# Patient Record
Sex: MALE | Race: OTHER | Employment: OTHER | URBAN - METROPOLITAN AREA
[De-identification: names, ages, dates, MRNs, and addresses within clinical notes are randomized per-mention and may not be internally consistent; named-entity substitution may affect disease eponyms.]

---

## 2017-09-29 ENCOUNTER — LAB REQUISITION (OUTPATIENT)
Dept: LAB | Facility: HOSPITAL | Age: 52
End: 2017-09-29
Payer: COMMERCIAL

## 2017-09-29 DIAGNOSIS — Z12.5 ENCOUNTER FOR SCREENING FOR MALIGNANT NEOPLASM OF PROSTATE: ICD-10-CM

## 2017-09-29 DIAGNOSIS — Z00.00 ENCOUNTER FOR GENERAL ADULT MEDICAL EXAMINATION WITHOUT ABNORMAL FINDINGS: ICD-10-CM

## 2017-09-29 PROCEDURE — 84153 ASSAY OF PSA TOTAL: CPT | Performed by: GENERAL PRACTICE

## 2017-09-29 PROCEDURE — 80053 COMPREHEN METABOLIC PANEL: CPT | Performed by: GENERAL PRACTICE

## 2017-09-29 PROCEDURE — 80061 LIPID PANEL: CPT | Performed by: GENERAL PRACTICE

## 2018-07-11 ENCOUNTER — OFFICE VISIT (OUTPATIENT)
Dept: INTERNAL MEDICINE CLINIC | Facility: CLINIC | Age: 53
End: 2018-07-11

## 2018-07-11 VITALS
BODY MASS INDEX: 31.07 KG/M2 | TEMPERATURE: 98 F | SYSTOLIC BLOOD PRESSURE: 150 MMHG | DIASTOLIC BLOOD PRESSURE: 80 MMHG | HEART RATE: 74 BPM | WEIGHT: 182 LBS | HEIGHT: 64 IN

## 2018-07-11 DIAGNOSIS — I10 ESSENTIAL HYPERTENSION: ICD-10-CM

## 2018-07-11 DIAGNOSIS — Z12.11 COLON CANCER SCREENING: ICD-10-CM

## 2018-07-11 DIAGNOSIS — E66.09 CLASS 1 OBESITY DUE TO EXCESS CALORIES WITHOUT SERIOUS COMORBIDITY WITH BODY MASS INDEX (BMI) OF 31.0 TO 31.9 IN ADULT: ICD-10-CM

## 2018-07-11 DIAGNOSIS — Z00.00 PHYSICAL EXAM: Primary | ICD-10-CM

## 2018-07-11 PROBLEM — E66.811 CLASS 1 OBESITY DUE TO EXCESS CALORIES WITHOUT SERIOUS COMORBIDITY WITH BODY MASS INDEX (BMI) OF 31.0 TO 31.9 IN ADULT: Status: ACTIVE | Noted: 2018-07-11

## 2018-07-11 PROCEDURE — 99386 PREV VISIT NEW AGE 40-64: CPT | Performed by: INTERNAL MEDICINE

## 2018-07-11 NOTE — PROGRESS NOTES
Randell Capps is a 52year old male.   Patient presents with:  Physical      HPI:   Pt comes as  A new pt -- here with wife   C/c physical  C/o physical   Came in after work patient also states that he is unable to sleep flat he has to sleep on the side, h lb (82.6 kg)   BMI 31.24 kg/m²   GENERAL: well developed, well nourished,in no apparent distress  SKIN: no rashes,no suspicious lesions  HEENT: atraumatic, normocephalic,ears and throat are clear, short thick neck, crowded oral pharynx   NECK: supple,no ad

## 2018-07-11 NOTE — PATIENT INSTRUCTIONS
Prevention Guidelines, Men Ages 48 to 59  Screening tests and vaccines are an important part of managing your health. Health counseling is essential, too. Below are guidelines for these, for men ages 48 to 59.  Talk with your healthcare provider to make s Prostate cancer Starting at age 39, talk to healthcare provider about risks and benefits of digital rectal exam (MAURICE) and prostate-specific antigen (PSA) screening1 At routine exams   Syphilis Men at increased risk for infection – talk with your healthcare pertussis (Td/Tdap) booster All men in this age group Td every 10 years, or a one-time dose of Tdap instead of a Td booster after age 25, then Td every 8 years   Zoster All men ages 61 and older 1 dose   Counseling Who needs it How often   Diet and exerci

## 2018-10-13 ENCOUNTER — APPOINTMENT (OUTPATIENT)
Dept: LAB | Age: 53
End: 2018-10-13
Attending: INTERNAL MEDICINE
Payer: COMMERCIAL

## 2018-10-13 DIAGNOSIS — Z00.00 PHYSICAL EXAM: ICD-10-CM

## 2018-10-13 PROCEDURE — 85025 COMPLETE CBC W/AUTO DIFF WBC: CPT | Performed by: INTERNAL MEDICINE

## 2018-10-13 PROCEDURE — 36415 COLL VENOUS BLD VENIPUNCTURE: CPT | Performed by: INTERNAL MEDICINE

## 2018-10-13 PROCEDURE — 80053 COMPREHEN METABOLIC PANEL: CPT | Performed by: INTERNAL MEDICINE

## 2018-10-13 PROCEDURE — 84443 ASSAY THYROID STIM HORMONE: CPT | Performed by: INTERNAL MEDICINE

## 2018-10-13 PROCEDURE — 80061 LIPID PANEL: CPT | Performed by: INTERNAL MEDICINE

## 2019-02-13 ENCOUNTER — OFFICE VISIT (OUTPATIENT)
Dept: INTERNAL MEDICINE CLINIC | Facility: CLINIC | Age: 54
End: 2019-02-13
Payer: COMMERCIAL

## 2019-02-13 VITALS
WEIGHT: 185 LBS | SYSTOLIC BLOOD PRESSURE: 148 MMHG | HEIGHT: 64 IN | BODY MASS INDEX: 31.58 KG/M2 | HEART RATE: 69 BPM | DIASTOLIC BLOOD PRESSURE: 87 MMHG

## 2019-02-13 DIAGNOSIS — I10 ESSENTIAL HYPERTENSION: Primary | ICD-10-CM

## 2019-02-13 PROCEDURE — 99213 OFFICE O/P EST LOW 20 MIN: CPT | Performed by: INTERNAL MEDICINE

## 2019-02-13 PROCEDURE — 99212 OFFICE O/P EST SF 10 MIN: CPT | Performed by: INTERNAL MEDICINE

## 2019-02-13 RX ORDER — HYDROCHLOROTHIAZIDE 12.5 MG/1
12.5 TABLET ORAL DAILY
Qty: 90 TABLET | Refills: 3 | Status: SHIPPED | OUTPATIENT
Start: 2019-02-13 | End: 2020-02-08

## 2019-02-13 NOTE — PROGRESS NOTES
Mary Lane is a 52year old male.   Patient presents with:  HTN      HPI:   Pt comes for physcial  C/c physical   C/o states his bp was 150 /90s at home -- old bp machine      Lives with family  Works as a          Current Outpatient Medications visit:    Essential hypertension  -     EKG 12-LEAD; Future  -     hydrochlorothiazide 12.5 MG Oral Tab; Take 1 tablet (12.5 mg total) by mouth daily.     Advised pt to follow a low salt , low sodium (including fast foods and processed foods), can look up D

## 2019-02-13 NOTE — PATIENT INSTRUCTIONS
Eating for your heart doesn’t have to be hard or boring. You just need to know how to make healthier choices. The DASH eating plan has been developed to help you do just that. DASH stands for Dietary Approaches to Stop Hypertension.  It is a plan that has b visible fat. Broil, grill, roast, or boil instead of frying. Remove skin from poultry before eating.  Limit how much red meat you eat.  Nuts, seeds, beans  Servings: 4 to 5 a week  A serving is:  · One-third cup nuts (one and a half ounces)  · 2 tablespoons salt  Stay away from:  · Sausage, quan, and ham  · Flour tortillas  · Packaged muffins, pancakes, and biscuits  · Instant hot cereals  · Cottage cheese  For lunch and dinner  · Fresh fish, chicken, turkey, or meat—baked, broiled, or roasted without salt

## 2021-04-29 ENCOUNTER — TELEPHONE (OUTPATIENT)
Dept: INTERNAL MEDICINE CLINIC | Facility: CLINIC | Age: 56
End: 2021-04-29

## 2021-04-29 DIAGNOSIS — Z12.11 COLON CANCER SCREENING: Primary | ICD-10-CM

## 2021-04-29 NOTE — TELEPHONE ENCOUNTER
Patient's spouse aware to have patient call GI to make appointment and to keep appointment with Katarina Jasso spouse she will need to have patient fill out form for her to be on patient's privacy list.

## 2021-04-29 NOTE — TELEPHONE ENCOUNTER
Patient's spouse called (not on JAKOB) states patient asked for her to call Dr. Margarita Enriquez office to ask for referral for preventative colonoscopy. Patient did not see GI doctor when referral was given in 2018.      Spouse was advised that patient over due for

## 2021-04-29 NOTE — TELEPHONE ENCOUNTER
We will see patient for his physical but signed pended referral--please ask him to make the appointment for the colonoscopy

## 2021-04-29 NOTE — TELEPHONE ENCOUNTER
Spoke to wife who requested to speak to [place] Loss since I would not provide information. Wife is not on HIPPA. Call transferred to Sean Loss.

## 2021-05-05 ENCOUNTER — OFFICE VISIT (OUTPATIENT)
Dept: INTERNAL MEDICINE CLINIC | Facility: CLINIC | Age: 56
End: 2021-05-05
Payer: COMMERCIAL

## 2021-05-05 VITALS
SYSTOLIC BLOOD PRESSURE: 144 MMHG | WEIGHT: 171 LBS | BODY MASS INDEX: 29.19 KG/M2 | DIASTOLIC BLOOD PRESSURE: 67 MMHG | HEIGHT: 64 IN | HEART RATE: 60 BPM

## 2021-05-05 DIAGNOSIS — Z12.11 ENCOUNTER FOR SCREENING COLONOSCOPY: ICD-10-CM

## 2021-05-05 DIAGNOSIS — Z00.00 PHYSICAL EXAM, ANNUAL: Primary | ICD-10-CM

## 2021-05-05 DIAGNOSIS — I10 ESSENTIAL HYPERTENSION: ICD-10-CM

## 2021-05-05 DIAGNOSIS — R73.01 ELEVATED FASTING BLOOD SUGAR: ICD-10-CM

## 2021-05-05 DIAGNOSIS — Z12.5 ENCOUNTER FOR SCREENING FOR MALIGNANT NEOPLASM OF PROSTATE: ICD-10-CM

## 2021-05-05 PROCEDURE — 3008F BODY MASS INDEX DOCD: CPT | Performed by: INTERNAL MEDICINE

## 2021-05-05 PROCEDURE — 99396 PREV VISIT EST AGE 40-64: CPT | Performed by: INTERNAL MEDICINE

## 2021-05-05 PROCEDURE — 3078F DIAST BP <80 MM HG: CPT | Performed by: INTERNAL MEDICINE

## 2021-05-05 PROCEDURE — 3077F SYST BP >= 140 MM HG: CPT | Performed by: INTERNAL MEDICINE

## 2021-05-05 RX ORDER — HYDROCHLOROTHIAZIDE 12.5 MG/1
12.5 TABLET ORAL DAILY
Qty: 90 TABLET | Refills: 0 | Status: SHIPPED | OUTPATIENT
Start: 2021-05-05

## 2021-05-05 NOTE — PROGRESS NOTES
Ashley Zhang is a 60year old male.   Patient presents with:  Physical      HPI:   Pt comes for his physical  C/c physical  C/o mom and dad had covid 6 mns ago and he had a stroke and he was taking care of them but he had no sympt -- both ok now or tenderness  EXTREMITIES: no cyanosis, or edema    ASSESSMENT AND PLAN:   Diagnoses and all orders for this visit:    Physical exam, annual  -     COMP METABOLIC PANEL (14); Future  -     LIPID PANEL;  Future  -     HEMOGLOBIN A1C; Future  -     ASSAY, TH

## 2021-05-27 ENCOUNTER — LAB ENCOUNTER (OUTPATIENT)
Dept: LAB | Facility: HOSPITAL | Age: 56
End: 2021-05-27
Attending: INTERNAL MEDICINE
Payer: COMMERCIAL

## 2021-05-27 DIAGNOSIS — Z12.5 ENCOUNTER FOR SCREENING FOR MALIGNANT NEOPLASM OF PROSTATE: ICD-10-CM

## 2021-05-27 DIAGNOSIS — R73.01 ELEVATED FASTING BLOOD SUGAR: ICD-10-CM

## 2021-05-27 DIAGNOSIS — Z00.00 PHYSICAL EXAM, ANNUAL: ICD-10-CM

## 2021-05-27 PROCEDURE — 80053 COMPREHEN METABOLIC PANEL: CPT

## 2021-05-27 PROCEDURE — 80061 LIPID PANEL: CPT

## 2021-05-27 PROCEDURE — 85027 COMPLETE CBC AUTOMATED: CPT

## 2021-05-27 PROCEDURE — 83036 HEMOGLOBIN GLYCOSYLATED A1C: CPT

## 2021-05-27 PROCEDURE — 36415 COLL VENOUS BLD VENIPUNCTURE: CPT

## 2021-05-27 PROCEDURE — 84443 ASSAY THYROID STIM HORMONE: CPT

## 2021-07-26 ENCOUNTER — LAB ENCOUNTER (OUTPATIENT)
Dept: LAB | Facility: HOSPITAL | Age: 56
End: 2021-07-26
Attending: INTERNAL MEDICINE
Payer: COMMERCIAL

## 2021-07-26 ENCOUNTER — NURSE TRIAGE (OUTPATIENT)
Dept: INTERNAL MEDICINE CLINIC | Facility: CLINIC | Age: 56
End: 2021-07-26

## 2021-07-26 DIAGNOSIS — R19.7 DIARRHEA, UNSPECIFIED TYPE: ICD-10-CM

## 2021-07-26 DIAGNOSIS — R19.7 DIARRHEA, UNSPECIFIED TYPE: Primary | ICD-10-CM

## 2021-07-26 NOTE — TELEPHONE ENCOUNTER
Action Requested: Summary for Provider     []  Critical Lab, Recommendations Needed  [x] Need Additional Advice  []   FYI    []   Need Orders  [] Need Medications Sent to Pharmacy  []  Other     SUMMARY: Dr. Mecca Mueller: please review.    Patient would like to

## 2021-07-26 NOTE — TELEPHONE ENCOUNTER
Spoke with patient (name and  verified), informed of message below. Gave patient phone number for Central Scheduling and transferred call to them.

## 2021-07-26 NOTE — TELEPHONE ENCOUNTER
Unlikely Covid but it has not been 2 weeks since his most recent vaccination so he is not considered \"fully vaccinated\"  Better to get tested due to rising numbers and variance  Covid testing ordered and he should follow the protocol of quarantine until

## 2021-07-27 LAB — SARS-COV-2 RNA RESP QL NAA+PROBE: NOT DETECTED

## 2022-06-21 ENCOUNTER — TELEPHONE (OUTPATIENT)
Dept: INTERNAL MEDICINE CLINIC | Facility: CLINIC | Age: 57
End: 2022-06-21

## 2022-06-21 NOTE — TELEPHONE ENCOUNTER
Patient spouse called and reported:  Yesterday patient started with fever. Scratchy throat, muscle aches, headaches. Temp 101.5; taking tylenol every 4 hours. She had questions if patient is covid positive, can he get anti-viral?  Advised its a possibility however provider would need to review over video visit and confirm if he meets criteria for anti-viral.     Discussed hand hygiene, start taking precautions. Test for covid today or tomorrow. Spouse will go to a near by testing facility (not [facility])    Call back with results.

## 2023-10-30 ENCOUNTER — HOSPITAL ENCOUNTER (OUTPATIENT)
Dept: GENERAL RADIOLOGY | Age: 58
Discharge: HOME | End: 2023-10-30
Attending: INTERNAL MEDICINE
Payer: COMMERCIAL

## 2023-10-30 ENCOUNTER — OFFICE VISIT (OUTPATIENT)
Dept: INTERNAL MEDICINE CLINIC | Facility: CLINIC | Age: 58
End: 2023-10-30

## 2023-10-30 VITALS
HEIGHT: 64 IN | RESPIRATION RATE: 16 BRPM | HEART RATE: 60 BPM | WEIGHT: 174.19 LBS | DIASTOLIC BLOOD PRESSURE: 90 MMHG | BODY MASS INDEX: 29.74 KG/M2 | SYSTOLIC BLOOD PRESSURE: 170 MMHG

## 2023-10-30 DIAGNOSIS — I10 ESSENTIAL HYPERTENSION: ICD-10-CM

## 2023-10-30 DIAGNOSIS — M25.561 ACUTE PAIN OF RIGHT KNEE: Primary | ICD-10-CM

## 2023-10-30 DIAGNOSIS — Z12.5 ENCOUNTER FOR SCREENING FOR MALIGNANT NEOPLASM OF PROSTATE: ICD-10-CM

## 2023-10-30 DIAGNOSIS — E66.09 CLASS 1 OBESITY DUE TO EXCESS CALORIES WITHOUT SERIOUS COMORBIDITY WITH BODY MASS INDEX (BMI) OF 31.0 TO 31.9 IN ADULT: ICD-10-CM

## 2023-10-30 DIAGNOSIS — M25.561 ACUTE PAIN OF RIGHT KNEE: ICD-10-CM

## 2023-10-30 DIAGNOSIS — Z12.11 COLON CANCER SCREENING: ICD-10-CM

## 2023-10-30 PROCEDURE — 3080F DIAST BP >= 90 MM HG: CPT | Performed by: INTERNAL MEDICINE

## 2023-10-30 PROCEDURE — 3077F SYST BP >= 140 MM HG: CPT | Performed by: INTERNAL MEDICINE

## 2023-10-30 PROCEDURE — 99214 OFFICE O/P EST MOD 30 MIN: CPT | Performed by: INTERNAL MEDICINE

## 2023-10-30 PROCEDURE — 73564 X-RAY EXAM KNEE 4 OR MORE: CPT | Performed by: INTERNAL MEDICINE

## 2023-10-30 PROCEDURE — 3008F BODY MASS INDEX DOCD: CPT | Performed by: INTERNAL MEDICINE

## 2023-10-30 RX ORDER — HYDROCHLOROTHIAZIDE 12.5 MG/1
12.5 TABLET ORAL DAILY
Qty: 90 TABLET | Refills: 0 | Status: SHIPPED | OUTPATIENT
Start: 2023-10-30

## 2023-11-02 ENCOUNTER — TELEPHONE (OUTPATIENT)
Dept: INTERNAL MEDICINE CLINIC | Facility: CLINIC | Age: 58
End: 2023-11-02

## 2023-11-02 NOTE — TELEPHONE ENCOUNTER
Pt would like to know when he can return to work and if Dr. Mejia can get him a letter to show his job. Pt was seen on 10/30. Please advise

## 2023-11-03 ENCOUNTER — MED REC SCAN ONLY (OUTPATIENT)
Dept: INTERNAL MEDICINE CLINIC | Facility: CLINIC | Age: 58
End: 2023-11-03

## 2023-11-03 NOTE — TELEPHONE ENCOUNTER
Relayed Dr message, verbalized understanding, stated he want another appt maybe Thursday for a f/u because he may need therapy if not better,advised he was given a PT referral, pt was unaware-relayed number    Please advise on appt -res slot for Thursday ,stated after appt he will know if he can return to work on the 13th

## 2023-11-03 NOTE — TELEPHONE ENCOUNTER
Letter was given at he day of his visit - letter said 2 weeks   Is he feeling better already   Pls assit with telemdicine visit if any concerns

## 2023-11-04 ENCOUNTER — LAB ENCOUNTER (OUTPATIENT)
Dept: LAB | Age: 58
End: 2023-11-04
Attending: INTERNAL MEDICINE
Payer: COMMERCIAL

## 2023-11-04 DIAGNOSIS — E66.09 CLASS 1 OBESITY DUE TO EXCESS CALORIES WITHOUT SERIOUS COMORBIDITY WITH BODY MASS INDEX (BMI) OF 31.0 TO 31.9 IN ADULT: ICD-10-CM

## 2023-11-04 DIAGNOSIS — I10 ESSENTIAL HYPERTENSION: ICD-10-CM

## 2023-11-04 DIAGNOSIS — Z12.5 ENCOUNTER FOR SCREENING FOR MALIGNANT NEOPLASM OF PROSTATE: ICD-10-CM

## 2023-11-04 LAB
ALBUMIN SERPL-MCNC: 4.3 G/DL (ref 3.2–4.8)
ALBUMIN/GLOB SERPL: 1.7 {RATIO} (ref 1–2)
ALP LIVER SERPL-CCNC: 46 U/L
ALT SERPL-CCNC: 16 U/L
ANION GAP SERPL CALC-SCNC: 9 MMOL/L (ref 0–18)
AST SERPL-CCNC: 15 U/L (ref ?–34)
BILIRUB SERPL-MCNC: 0.5 MG/DL (ref 0.2–1.1)
BUN BLD-MCNC: 14 MG/DL (ref 9–23)
BUN/CREAT SERPL: 14.6 (ref 10–20)
CALCIUM BLD-MCNC: 9 MG/DL (ref 8.7–10.4)
CHLORIDE SERPL-SCNC: 103 MMOL/L (ref 98–112)
CHOLEST SERPL-MCNC: 189 MG/DL (ref ?–200)
CO2 SERPL-SCNC: 26 MMOL/L (ref 21–32)
COMPLEXED PSA SERPL-MCNC: 1.57 NG/ML (ref ?–4)
COMPLEXED PSA SERPL-MCNC: 2.64 NG/ML (ref ?–4)
CREAT BLD-MCNC: 0.96 MG/DL
DEPRECATED RDW RBC AUTO: 41.5 FL (ref 35.1–46.3)
EGFRCR SERPLBLD CKD-EPI 2021: 90 ML/MIN/1.73M2 (ref 60–?)
ERYTHROCYTE [DISTWIDTH] IN BLOOD BY AUTOMATED COUNT: 12.9 % (ref 11–15)
FASTING PATIENT LIPID ANSWER: YES
FASTING STATUS PATIENT QL REPORTED: YES
GLOBULIN PLAS-MCNC: 2.6 G/DL (ref 2.8–4.4)
GLUCOSE BLD-MCNC: 95 MG/DL (ref 70–99)
HCT VFR BLD AUTO: 43.6 %
HDLC SERPL-MCNC: 48 MG/DL (ref 40–59)
HGB BLD-MCNC: 14.4 G/DL
LDLC SERPL CALC-MCNC: 128 MG/DL (ref ?–100)
MCH RBC QN AUTO: 29.1 PG (ref 26–34)
MCHC RBC AUTO-ENTMCNC: 33 G/DL (ref 31–37)
MCV RBC AUTO: 88.1 FL
NONHDLC SERPL-MCNC: 141 MG/DL (ref ?–130)
OSMOLALITY SERPL CALC.SUM OF ELEC: 286 MOSM/KG (ref 275–295)
PLATELET # BLD AUTO: 288 10(3)UL (ref 150–450)
POTASSIUM SERPL-SCNC: 4.2 MMOL/L (ref 3.5–5.1)
PROT SERPL-MCNC: 6.9 G/DL (ref 5.7–8.2)
RBC # BLD AUTO: 4.95 X10(6)UL
SODIUM SERPL-SCNC: 138 MMOL/L (ref 136–145)
TRIGL SERPL-MCNC: 71 MG/DL (ref 30–149)
TSI SER-ACNC: 1.43 MIU/ML (ref 0.55–4.78)
VLDLC SERPL CALC-MCNC: 13 MG/DL (ref 0–30)
WBC # BLD AUTO: 5.8 X10(3) UL (ref 4–11)

## 2023-11-04 PROCEDURE — 80053 COMPREHEN METABOLIC PANEL: CPT

## 2023-11-04 PROCEDURE — 36415 COLL VENOUS BLD VENIPUNCTURE: CPT

## 2023-11-04 PROCEDURE — 80061 LIPID PANEL: CPT

## 2023-11-04 PROCEDURE — 84443 ASSAY THYROID STIM HORMONE: CPT

## 2023-11-04 PROCEDURE — 85027 COMPLETE CBC AUTOMATED: CPT

## 2023-11-06 NOTE — TELEPHONE ENCOUNTER
Patient calling to follow up on call, stated is still experiencing pain in knees, and is suppose to be going back to work on Monday. Stated knows should be getting physical therapy, asking if should be going back to work still, or what to tell work.

## 2023-11-07 NOTE — TELEPHONE ENCOUNTER
Patient was left a message to call back.   CSS: ok to schedule [facility] Thursday. (1 slot available)    Detail message was left to call and schedule appt.   I also left ortho [phone] ph# on . He can see anyone available.

## 2023-11-07 NOTE — TELEPHONE ENCOUNTER
His after visit summary was printed and given to him I believe and I would have highlighted the appointments/numbers including the physical therapy if my memory serves me correctly    If he is in so much pain then he should make the appointment with Ortho as I have already placed the referral for Ortho    You can put him  as a telemedicine visit slot for Thursday if that is when he wants the visit

## 2023-11-09 ENCOUNTER — OFFICE VISIT (OUTPATIENT)
Dept: INTERNAL MEDICINE CLINIC | Facility: CLINIC | Age: 58
End: 2023-11-09

## 2023-11-09 VITALS
WEIGHT: 176 LBS | RESPIRATION RATE: 16 BRPM | DIASTOLIC BLOOD PRESSURE: 79 MMHG | SYSTOLIC BLOOD PRESSURE: 155 MMHG | HEIGHT: 64 IN | BODY MASS INDEX: 30.05 KG/M2 | HEART RATE: 62 BPM

## 2023-11-09 DIAGNOSIS — G89.29 CHRONIC PAIN OF RIGHT KNEE: Primary | ICD-10-CM

## 2023-11-09 DIAGNOSIS — M25.561 CHRONIC PAIN OF RIGHT KNEE: Primary | ICD-10-CM

## 2023-11-09 DIAGNOSIS — I10 ESSENTIAL HYPERTENSION: ICD-10-CM

## 2023-11-09 PROCEDURE — 99214 OFFICE O/P EST MOD 30 MIN: CPT | Performed by: INTERNAL MEDICINE

## 2023-11-09 PROCEDURE — 3077F SYST BP >= 140 MM HG: CPT | Performed by: INTERNAL MEDICINE

## 2023-11-09 PROCEDURE — 3008F BODY MASS INDEX DOCD: CPT | Performed by: INTERNAL MEDICINE

## 2023-11-09 PROCEDURE — 3078F DIAST BP <80 MM HG: CPT | Performed by: INTERNAL MEDICINE

## 2023-11-09 RX ORDER — HYDROCHLOROTHIAZIDE 25 MG/1
25 TABLET ORAL DAILY
Qty: 90 TABLET | Refills: 2 | Status: SHIPPED | OUTPATIENT
Start: 2023-11-09

## 2023-11-13 ENCOUNTER — OFFICE VISIT (OUTPATIENT)
Dept: ORTHOPEDICS CLINIC | Facility: CLINIC | Age: 58
End: 2023-11-13

## 2023-11-13 VITALS — BODY MASS INDEX: 30.05 KG/M2 | HEIGHT: 64 IN | WEIGHT: 176 LBS

## 2023-11-13 DIAGNOSIS — M76.891 TENDINITIS OF RIGHT QUADRICEPS TENDON: Primary | ICD-10-CM

## 2023-11-13 RX ORDER — MELOXICAM 15 MG/1
15 TABLET ORAL DAILY
Qty: 30 TABLET | Refills: 0 | Status: SHIPPED | OUTPATIENT
Start: 2023-11-13

## 2023-11-14 ENCOUNTER — TELEPHONE (OUTPATIENT)
Dept: INTERNAL MEDICINE CLINIC | Facility: CLINIC | Age: 58
End: 2023-11-14

## 2023-11-14 NOTE — TELEPHONE ENCOUNTER
Pt called and wanted status on forms that [employer] Purpura for short term disability. Pt stts Steffany perez faxed it to office. Please advise.

## 2023-11-15 ENCOUNTER — TELEPHONE (OUTPATIENT)
Dept: PHYSICAL THERAPY | Facility: HOSPITAL | Age: 58
End: 2023-11-15

## 2023-11-15 NOTE — TELEPHONE ENCOUNTER
Patient calling (identified name and ) to follow up on the forms for short term disability. States it needs to be turned in by 23.

## 2023-11-16 ENCOUNTER — OFFICE VISIT (OUTPATIENT)
Dept: PHYSICAL THERAPY | Age: 58
End: 2023-11-16
Attending: INTERNAL MEDICINE
Payer: COMMERCIAL

## 2023-11-16 DIAGNOSIS — M25.561 ACUTE PAIN OF RIGHT KNEE: Primary | ICD-10-CM

## 2023-11-16 PROCEDURE — 97112 NEUROMUSCULAR REEDUCATION: CPT

## 2023-11-16 PROCEDURE — 97110 THERAPEUTIC EXERCISES: CPT

## 2023-11-16 PROCEDURE — 97140 MANUAL THERAPY 1/> REGIONS: CPT

## 2023-11-17 NOTE — TELEPHONE ENCOUNTER
Called pt spoke to pt informing him we have not received forms pt stated he Spoke with dr Parisa Abarca and was told that she had them and will forward to us. Explained to pt we did not received them via email. Checked through email and did not find will check interoffice for forms. Contacted Dr Parisa Abarca MA to verify if forms were there and she did not have forms.

## 2023-11-20 ENCOUNTER — APPOINTMENT (OUTPATIENT)
Dept: PHYSICAL THERAPY | Age: 58
End: 2023-11-20
Attending: INTERNAL MEDICINE
Payer: COMMERCIAL

## 2023-11-22 ENCOUNTER — OFFICE VISIT (OUTPATIENT)
Dept: PHYSICAL THERAPY | Age: 58
End: 2023-11-22
Attending: INTERNAL MEDICINE
Payer: COMMERCIAL

## 2023-11-22 PROCEDURE — 97110 THERAPEUTIC EXERCISES: CPT

## 2023-11-22 NOTE — PROGRESS NOTES
Diagnosis:   Acute pain of right knee (M25.561)          Referring Provider: Latrice Euceda  Date of Evaluation:    11/16/2023    Precautions:  None Next MD visit:   none scheduled  Date of Surgery: n/a   Insurance Primary/Secondary: Maycol Cruz / N/A     # Auth Visits: n/a            Subjective: Feeling a little better when doing the HEP stretches; after session, pt notes some soreness/fatigue in hips/thighs. Pain: 1/10      Objective: See below treatment log      Assessment: Good tolerance for all exercises, but clearly compensating with technique during hip abd SLR's due to fatigue/weakness. Goals:   - Pt indep with HEP 5 of 7 days a week. - BLE flexibility to [facility] to allow for improved walking with min c/o. - No TTP noted quad and medial knee region.  - Pt with 5/5 MMT RLE to allow for good endurance with standing at machine at work all day with min c/o at worst.    Plan: Continue to progress hip/LE strengthening as able without knee pain. Date: 11/22/2023  TX#: 2/- Date:                 TX#: 3/ Date:                 TX#: 4/ Date:                 TX#: 5/ Date: Tx#: 6/   Ther Ex (39')  - UE/LE bike x 6'  - Standing Hamstring Stretch with Step    - Prone Quadriceps Stretch with Strap    - Supine Single Knee to Chest Stretch   - Straight Leg Raise 30x B  - Prone Hip Extension 30x B  - Sidelying Hip Abduction 30x B  - Shuttle B Leg press 6bands B 30x                     HEP: Access Code [email or web address]    Charges:  Ther Ex x3       Total Timed Treatment: 45 min  Total Treatment Time: 45 min

## 2023-11-27 ENCOUNTER — OFFICE VISIT (OUTPATIENT)
Dept: PHYSICAL THERAPY | Age: 58
End: 2023-11-27
Attending: INTERNAL MEDICINE
Payer: COMMERCIAL

## 2023-11-27 PROCEDURE — 97110 THERAPEUTIC EXERCISES: CPT

## 2023-11-27 NOTE — PROGRESS NOTES
Diagnosis:   Acute pain of right knee (M25.561)          Referring Provider: Jacob Norman  Date of Evaluation:    11/16/2023    Precautions:  None Next MD visit:   none scheduled  Date of Surgery: n/a   Insurance Primary/Secondary: [employer] / N/A     # Auth Visits: n/a            Subjective: Did well over long weekend, but did have one day with increased R knee pain, but better now. Pain: 1/10      Objective: See below treatment log      Assessment: Good tolerance for 1# wt during SLR's, most difficult with abd. Goals:   - Pt indep with HEP 5 of 7 days a week. - BLE flexibility to [facility] to allow for improved walking with min c/o. - No TTP noted quad and medial knee region.  - Pt with 5/5 MMT RLE to allow for good endurance with standing at machine at work all day with min c/o at worst.    Plan: Continue to progress hip/LE strengthening as able without knee pain. Date: 11/22/2023  TX#: 2/- Date: 11/27/2023           TX#: 3/ Date:                 TX#: 4/ Date:                 TX#: 5/ Date: Tx#: 6/   Ther Ex (39')  - UE/LE bike x 6'  - Standing Hamstring Stretch with Step    - Prone Quadriceps Stretch with Strap    - Supine Single Knee to Chest Stretch   - Straight Leg Raise 30x B  - Prone Hip Extension 30x B  - Sidelying Hip Abduction 30x B  - Shuttle B Leg press 6bands B 30x Ther Ex (45')  - UE/LE bike x 6'  - Standing Hamstring Stretch with Step    - Prone Quadriceps Stretch with Strap    - Supine Single Knee to Chest Stretch   - Straight Leg Raise 30x B 1#  - Prone Hip Extension 30x B 1#  - Sidelying Hip Abduction 30x B 1#  - Shuttle: B Leg press 7bands B 30x; R/L leg press 5bands 30x ea                    HEP: Access Code [email or web address] - HEP - pt to wear heavier boots for resistance. Charges:  Ther Ex x3       Total Timed Treatment: 45 min  Total Treatment Time: 45 min

## 2023-11-29 ENCOUNTER — APPOINTMENT (OUTPATIENT)
Dept: PHYSICAL THERAPY | Age: 58
End: 2023-11-29
Attending: INTERNAL MEDICINE
Payer: COMMERCIAL

## 2023-11-29 ENCOUNTER — OFFICE VISIT (OUTPATIENT)
Dept: PHYSICAL THERAPY | Age: 58
End: 2023-11-29
Attending: INTERNAL MEDICINE
Payer: COMMERCIAL

## 2023-11-29 ENCOUNTER — TELEPHONE (OUTPATIENT)
Dept: INTERNAL MEDICINE CLINIC | Facility: CLINIC | Age: 58
End: 2023-11-29

## 2023-11-29 PROCEDURE — 97110 THERAPEUTIC EXERCISES: CPT

## 2023-11-29 NOTE — TELEPHONE ENCOUNTER
Patients spouse is calling and checking the status on this. I informed her per message below that the forms were faxed to the forms department. Nicko then began to question me on things that I had no answer to and tried to provide the forms department phone number for her and she did not take it stating that she was told they would receive a phone call and she is upset because they have not received a call and would like to speak with Rae Lebron.

## 2023-11-29 NOTE — TELEPHONE ENCOUNTER
Faxed over [facility] paperwork for patient, will reach out to forms department to ensure that forms were received

## 2023-11-29 NOTE — TELEPHONE ENCOUNTER
Pt called today I reiterated to him that we never received forms, pt states he will call Malini  to get forms sent via fax or email-- email address and fax number provided to pt.      Gathered the below    Type of Leave:  Continuous FMLA/ DISAB  Reason for Leave:Chronic Right knee pain- currently in PT   Start date of leave: 10/31/2023 - currently off work no rtw date- tbd  How much time needed?: TBD  Forms Due Date: ASAP  Was Fee and Turnaround info Given?: Yes

## 2023-11-29 NOTE — TELEPHONE ENCOUNTER
FMLA/Disab forms received in the forms dept. No valid auth on file. [employer] message sent to pt. Logged for processing. Spoke to phone room rep, she said that she would inform the pt that his forms have been received & our current turn around time.

## 2023-11-29 NOTE — PROGRESS NOTES
Diagnosis:   Acute pain of right knee (M25.561)          Referring Provider: Yu Camp  Date of Evaluation:    11/16/2023    Precautions:  None Next MD visit:   none scheduled  Date of Surgery: n/a   Insurance Primary/Secondary: [employer] / N/A     # Auth Visits: n/a            Subjective: Mild increase in irritation at R knee joint line. Wonders if new single leg press challenge last visit was too much. He did ice the knee yesterday for about 10'. Pain: 1/10      Objective: See below treatment log      Assessment: Good tolerance for 1# wt during SLR's, most difficult with abd. Goals:   - Pt indep with HEP 5 of 7 days a week. - BLE flexibility to [employer] to allow for improved walking with min c/o. - No TTP noted quad and medial knee region.  - Pt with 5/5 MMT RLE to allow for good endurance with standing at machine at work all day with min c/o at worst.    Plan: Continue to progress hip/LE strengthening as able without knee pain. Pt to ice knee at home each of the next few days as well. Date: 11/22/2023  TX#: 2/- Date: 11/27/2023           TX#: 3/ Date:   11/29/2023         TX#: 4/ Date:                 TX#: 5/ Date: Tx#: 6/   Ther Ex (39')  - UE/LE bike x 6'  - Standing Hamstring Stretch with Step    - Prone Quadriceps Stretch with Strap    - Supine Single Knee to Chest Stretch   - Straight Leg Raise 30x B  - Prone Hip Extension 30x B  - Sidelying Hip Abduction 30x B  - Shuttle B Leg press 6bands B 30x Ther Ex (45')  - UE/LE bike x 6'  - Standing Hamstring Stretch with Step    - Prone Quadriceps Stretch with Strap    - Supine Single Knee to Chest Stretch   - Straight Leg Raise 30x B 1#  - Prone Hip Extension 30x B 1#  - Sidelying Hip Abduction 30x B 1#  - Shuttle:  B Leg press 7bands B 30x; R/L leg press 5bands 30x ea Ther Ex (45')  - UE/LE bike x 6'  - Standing Hamstring Stretch with Step    - Prone Quadriceps Stretch with Strap    - Supine Single Knee to Chest Stretch   - Straight Leg Raise 30x B 1#  - Prone Hip Extension 30x B 1#  - Sidelying Hip Abduction 30x B 1#  - Shuttle: B Leg press 7bands B 30x                   HEP: Access Code [email or web address] - HEP - pt to wear heavier boots for resistance. Charges:  Ther Ex x3       Total Timed Treatment: 45 min  Total Treatment Time: 45 min

## 2023-11-30 ENCOUNTER — APPOINTMENT (OUTPATIENT)
Dept: PHYSICAL THERAPY | Facility: HOSPITAL | Age: 58
End: 2023-11-30
Attending: INTERNAL MEDICINE
Payer: COMMERCIAL

## 2023-11-30 NOTE — TELEPHONE ENCOUNTER
Patient called, asking to speak with Lesli Marmolejo, states he is at [phone]. Per communication with Lesli Marmolejo, informed him that Lesli Marmolejo will call him back shortly.

## 2023-11-30 NOTE — TELEPHONE ENCOUNTER
Dr. Nata Parra     *The ACKNOWLEDGE button has been moved to the top right ribbon*    Please sign off on form if you agree to: Fmla/disab due to Chronic R knee pain. Start date 10/30/23-RTW 01/15/24.  (place your signature on the first page only)    -From your Inbasket, Highlight the patient and click Chart   -Double click the [id number] Forms Completion telephone encounter  -Scroll down to the Media section   -Click the blue Hyperlink: Fmla/Disab Dr Nata Parra 82/80/95  -Click Acknowledge located in the top right ribbon/menu   -Drag the mouse into the blank space of the document and a + sign will appear. Left click to   electronically sign the document.      Thank you,      Marquez Colon

## 2023-11-30 NOTE — TELEPHONE ENCOUNTER
Contacted patient and was informed that he is to return to work on 1/15/24.  Time frame will be from 10/30/23-1/14/24 return on 1/15/24

## 2023-11-30 NOTE — TELEPHONE ENCOUNTER
Called patient and left a voicemail to call the office back. FMLA forms need a time period for when patient is requesting time off. When patient calls back please transfer to either [facility] or site.    Thank you in advance

## 2023-11-30 NOTE — TELEPHONE ENCOUNTER
Pt called to confirm we received MyChart consent. Informed pt we did and his forms will be in process of completion.  Pt states his RTW is 1/15/24

## 2023-12-01 ENCOUNTER — NURSE ONLY (OUTPATIENT)
Facility: CLINIC | Age: 58
End: 2023-12-01

## 2023-12-01 DIAGNOSIS — Z12.11 COLON CANCER SCREENING: Primary | ICD-10-CM

## 2023-12-04 RX ORDER — SODIUM, POTASSIUM,MAG SULFATES 17.5-3.13G
SOLUTION, RECONSTITUTED, ORAL ORAL
Qty: 1 EACH | Refills: 0 | Status: SHIPPED | OUTPATIENT
Start: 2023-12-04

## 2023-12-04 NOTE — TELEPHONE ENCOUNTER
Patient called requesting status on forms- was advised forms will be faxed out today and will send mychart once we get confirmation- Forms faxed and awaiting confirmation. . Patient is requesting for forms to be attached to [employer] message

## 2023-12-04 NOTE — PROGRESS NOTES
Dx: average risk crc screening  Colonoscopy with MAC sedation  Split dose suprep  Please review prep instructions with patient    - If the patient is taking oral diabetic medications then they should HOLD diabetic medications the night before and/or the day of the procedure   - If the patient is on insulin, please have the PCP or endocrinologist assist with management of dosing prior to the procedure.  - NO herbal supplements or weight loss medications x 7 days prior to the procedure. - If patient is taking Xarelto OR Eliquis then it needs to be helf for 48 hours prior to the procedure --> Should get approval from the prescribing physician (PCP or cardiologist) to hold this medication prior to the procedure. - If the patient is taking Coumadin then it needs to be on hold Coumadin for 5 days prior to the procedure --> Should get approval from the prescribing physician (PCP or cardiologist) to hold this medication prior to the procedure. - If the patient takes Plavix/Brilinta then it needs to be held for 5 days prior to the procedure --> Should get approval from the prescribing physician (PCP or cardiologist) to hold this medication prior to the procedure. *If the bowel prep prescribed is too expensive/not covered by the patient's insurance please pend an alternative and I will sign that order. Thank you.

## 2023-12-04 NOTE — TELEPHONE ENCOUNTER
Fmla/disab forms faxed to [facility] [phone], right fax confirmation received.  [employer] message sent and forms uploaded per pt request.

## 2023-12-05 ENCOUNTER — APPOINTMENT (OUTPATIENT)
Dept: PHYSICAL THERAPY | Facility: HOSPITAL | Age: 58
End: 2023-12-05
Attending: INTERNAL MEDICINE
Payer: COMMERCIAL

## 2023-12-06 ENCOUNTER — OFFICE VISIT (OUTPATIENT)
Dept: PHYSICAL THERAPY | Age: 58
End: 2023-12-06
Attending: INTERNAL MEDICINE
Payer: COMMERCIAL

## 2023-12-06 PROCEDURE — 97110 THERAPEUTIC EXERCISES: CPT

## 2023-12-06 NOTE — PROGRESS NOTES
Diagnosis:   Acute pain of right knee (M25.561)          Referring Provider: Chantal Ribera  Date of Evaluation:    11/16/2023    Precautions:  None Next MD visit:   none scheduled  Date of Surgery: n/a   Insurance Primary/Secondary: [employer] / N/A     # Auth Visits: n/a            Subjective: Noticeably better, though still some medial R patellar symptoms. Pain: 1/10      Objective: See below treatment log      Assessment: Good tolerance for new exercises in NWB. Goals:   - Pt indep with HEP 5 of 7 days a week. - [facility] to allow for improved walking with min c/o. - No TTP noted quad and medial knee region.  - Pt with 5/5 MMT RLE to allow for good endurance with standing at machine at work all day with min c/o at worst.    Plan: Continue to progress hip/LE strengthening as able without knee pain. Pt to ice knee at home each of the next few days as well. Date: 11/22/2023  TX#: 2/- Date: 11/27/2023           TX#: 3/ Date:   11/29/2023         TX#: 4/ Date:  12/6/2023      TX#: 5/- Date: Tx#: 6/   Ther Ex (39')  - UE/LE bike x 6'  - Standing Hamstring Stretch with Step    - Prone Quadriceps Stretch with Strap    - Supine Single Knee to Chest Stretch   - Straight Leg Raise 30x B  - Prone Hip Extension 30x B  - Sidelying Hip Abduction 30x B  - Shuttle B Leg press 6bands B 30x Ther Ex (45')  - UE/LE bike x 6'  - Standing Hamstring Stretch with Step    - Prone Quadriceps Stretch with Strap    - Supine Single Knee to Chest Stretch   - Straight Leg Raise 30x B 1#  - Prone Hip Extension 30x B 1#  - Sidelying Hip Abduction 30x B 1#  - Shuttle: B Leg press 7bands B 30x; R/L leg press 5bands 30x ea Ther Ex (45')  - UE/LE bike x 6'  - Standing Hamstring Stretch with Step    - Prone Quadriceps Stretch with Strap    - Supine Single Knee to Chest Stretch   - Straight Leg Raise 30x B 1#  - Prone Hip Extension 30x B 1#  - Sidelying Hip Abduction 30x B 1#  - Shuttle:  B Leg press 7bands B 30x Ther Ex (45')  - UE/LE bike x 6'  - Standing Hamstring Stretch with Step    - Prone Quadriceps Stretch with Strap    - Supine Single Knee to Chest Stretch   - Straight Leg Raise 30x B 1#  - LAQ 30x B 1#  - Prone Hip Extension 30x B 1#  - Prone HS curl 30x B 1#  - Sidelying Hip Abduction 30x B 1#  - Shuttle: B Leg press 7bands B 30x                  HEP: Access Code [email or web address] - HEP - pt to wear heavier boots for resistance. Charges:  Ther Ex x3       Total Timed Treatment: 45 min  Total Treatment Time: 45 min

## 2023-12-07 ENCOUNTER — APPOINTMENT (OUTPATIENT)
Dept: PHYSICAL THERAPY | Facility: HOSPITAL | Age: 58
End: 2023-12-07
Attending: INTERNAL MEDICINE
Payer: COMMERCIAL

## 2023-12-11 NOTE — PROGRESS NOTES
Scheduled for:  Colonoscopy [place]  Provider Name:  Dr. Day  Date:  3/11/2024  Location:  [facility]  Sedation:  MAC  Time:  10:45 AM (pt is aware to arrive at 9:45 AM)  Prep:  Suprep  Meds/Allergies Reconciled?: Physician reviewed  Diagnosis with codes:  Colon screening Z12.11  Was patient informed to call insurance with codes (Y/N):  Yes, [facility]. Referral sent?:  Referral was sent at the time of electronic surgical scheduling. [address] [facility] or [facility] [address] [place] [address] notified?:  I sent an electronic request to Endo Scheduling and received a confirmation today. Medication Orders:  N/A   Misc Orders:  N/A   Further instructions given by staff: I discussed the prep instructions with the patient which he verbally understood and is aware that I will send the instructions today.

## 2023-12-12 ENCOUNTER — APPOINTMENT (OUTPATIENT)
Dept: PHYSICAL THERAPY | Facility: HOSPITAL | Age: 58
End: 2023-12-12
Attending: INTERNAL MEDICINE
Payer: COMMERCIAL

## 2023-12-13 ENCOUNTER — OFFICE VISIT (OUTPATIENT)
Dept: PHYSICAL THERAPY | Age: 58
End: 2023-12-13
Attending: INTERNAL MEDICINE
Payer: COMMERCIAL

## 2023-12-13 PROCEDURE — 97110 THERAPEUTIC EXERCISES: CPT

## 2023-12-13 NOTE — PROGRESS NOTES
Diagnosis:   Acute pain of right knee (M25.561)          Referring Provider: Jacob Norman  Date of Evaluation:    11/16/2023    Precautions:  None Next MD visit:   none scheduled  Date of Surgery: n/a   Insurance Primary/Secondary: [facility] / N/A     # Auth Visits: n/a            Subjective: Tolerating more activity. And, added some UE weight exercises at home too. Objective: See below treatment log      Assessment: Progressing well. Goals:   - Pt indep with HEP 5 of 7 days a week. - BLE flexibility to [facility] to allow for improved walking with min c/o. - No TTP noted quad and medial knee region.  - Pt with 5/5 MMT RLE to allow for good endurance with standing at machine at work all day with min c/o at worst.    Plan: Next visit increase to 2# if able. Date: 11/22/2023  TX#: 2/- Date: 11/27/2023           TX#: 3/ Date:   11/29/2023         TX#: 4/ Date:  12/6/2023      TX#: 5/- Date: 12/13/2023  Tx#: 6/-   Ther Ex (39')  - UE/LE bike x 6'  - Standing Hamstring Stretch with Step    - Prone Quadriceps Stretch with Strap    - Supine Single Knee to Chest Stretch   - Straight Leg Raise 30x B  - Prone Hip Extension 30x B  - Sidelying Hip Abduction 30x B  - Shuttle B Leg press 6bands B 30x Ther Ex (45')  - UE/LE bike x 6'  - Standing Hamstring Stretch with Step    - Prone Quadriceps Stretch with Strap    - Supine Single Knee to Chest Stretch   - Straight Leg Raise 30x B 1#  - Prone Hip Extension 30x B 1#  - Sidelying Hip Abduction 30x B 1#  - Shuttle: B Leg press 7bands B 30x; R/L leg press 5bands 30x ea Ther Ex (45')  - UE/LE bike x 6'  - Standing Hamstring Stretch with Step    - Prone Quadriceps Stretch with Strap    - Supine Single Knee to Chest Stretch   - Straight Leg Raise 30x B 1#  - Prone Hip Extension 30x B 1#  - Sidelying Hip Abduction 30x B 1#  - Shuttle:  B Leg press 7bands B 30x Ther Ex (45')  - UE/LE bike x 6'  - Standing Hamstring Stretch with Step    - Prone Quadriceps Stretch with Strap    - Supine Single Knee to Chest Stretch   - Straight Leg Raise 30x B 1#  - LAQ 30x B 1#  - Prone Hip Extension 30x B 1#  - Prone HS curl 30x B 1#  - Sidelying Hip Abduction 30x B 1#  - Shuttle: B Leg press 7bands B 30x Ther Ex (45')  - UE/LE bike x 6'  - Standing Hamstring Stretch with Step    - Prone Quadriceps Stretch with Strap    - Supine Single Knee to Chest Stretch   - Straight Leg Raise 30x B 1#  - LAQ 30x B 1#  - Prone Hip Extension 30x B 1#  - Prone HS curl 30x B 1#  - Sidelying Hip Abduction 30x B 1#  - Shuttle: B Leg press 7bands B 30x  - Shuttle: R, L leg press 4bands ea 30x                 HEP: Access Code [email or web address] - HEP - pt to wear heavier boots for resistance. Charges:  Ther Ex x3       Total Timed Treatment: 45 min  Total Treatment Time: 45 min

## 2023-12-14 ENCOUNTER — APPOINTMENT (OUTPATIENT)
Dept: PHYSICAL THERAPY | Facility: HOSPITAL | Age: 58
End: 2023-12-14
Attending: INTERNAL MEDICINE
Payer: COMMERCIAL

## 2023-12-20 ENCOUNTER — TELEPHONE (OUTPATIENT)
Dept: PHYSICAL THERAPY | Facility: HOSPITAL | Age: 58
End: 2023-12-20

## 2023-12-20 ENCOUNTER — APPOINTMENT (OUTPATIENT)
Dept: PHYSICAL THERAPY | Age: 58
End: 2023-12-20
Attending: INTERNAL MEDICINE
Payer: COMMERCIAL

## 2023-12-20 ENCOUNTER — OFFICE VISIT (OUTPATIENT)
Dept: PHYSICAL THERAPY | Age: 58
End: 2023-12-20
Attending: INTERNAL MEDICINE
Payer: COMMERCIAL

## 2023-12-20 PROCEDURE — 97110 THERAPEUTIC EXERCISES: CPT

## 2023-12-20 NOTE — PROGRESS NOTES
Diagnosis:   Acute pain of right knee (M25.561)          Referring Provider: Bill Márquez  Date of Evaluation:    11/16/2023    Precautions:  None Next MD visit:   none scheduled  Date of Surgery: n/a   Insurance Primary/Secondary: [employer] / N/A     # Auth Visits: n/a            Subjective: Notes more time with no pain, such as walking earlier this week, but then still notes some windows of time with symptoms yet. Objective: See below treatment log      Assessment: Progressing well. Good tolerance for increased resistance today. Goals:   - Pt indep with HEP 5 of 7 days a week. - BLE flexibility to [facility] to allow for improved walking with min c/o. - No TTP noted quad and medial knee region.  - Pt with 5/5 MMT RLE to allow for good endurance with standing at machine at work all day with min c/o at worst.    Plan: Next visit increase to 2# if able. Date: 12/20/2023  TX#: 7/- Date:            TX#:  Date:           TX#:  Date:       TX#:  Date: Tx#:    Ther Ex (40')  - UE/LE bike x 6'  - Standing Hamstring Stretch with Step    - Prone Quadriceps Stretch with Strap    - Supine Single Knee to Chest Stretch   - Straight Leg Raise 30x B 1#  - LAQ 30x B 2#  - Prone Hip Extension 30x B 2#  - Prone HS curl 30x B 2#  - Sidelying Hip Abduction 30x B 2#  - Shuttle: B Leg press 8bands B 30x  - Shuttle: R, L leg press 4bands ea 30x       Neuro Re-Ed (5')  - SLS variations (EO, EC)              HEP: Access Code [email or web address] - HEP - pt to wear heavier boots for resistance. Charges:  Ther Ex x3       Total Timed Treatment: 45 min  Total Treatment Time: 45 min

## 2024-01-09 ENCOUNTER — OFFICE VISIT (OUTPATIENT)
Dept: INTERNAL MEDICINE CLINIC | Facility: CLINIC | Age: 59
End: 2024-01-09
Payer: COMMERCIAL

## 2024-01-09 VITALS
HEIGHT: 64 IN | DIASTOLIC BLOOD PRESSURE: 80 MMHG | HEART RATE: 62 BPM | WEIGHT: 180 LBS | SYSTOLIC BLOOD PRESSURE: 142 MMHG | BODY MASS INDEX: 30.73 KG/M2 | OXYGEN SATURATION: 94 %

## 2024-01-09 DIAGNOSIS — M25.561 ACUTE PAIN OF RIGHT KNEE: Primary | ICD-10-CM

## 2024-01-09 PROCEDURE — 99213 OFFICE O/P EST LOW 20 MIN: CPT | Performed by: NURSE PRACTITIONER

## 2024-01-09 PROCEDURE — 3079F DIAST BP 80-89 MM HG: CPT | Performed by: NURSE PRACTITIONER

## 2024-01-09 PROCEDURE — 3008F BODY MASS INDEX DOCD: CPT | Performed by: NURSE PRACTITIONER

## 2024-01-09 PROCEDURE — 3077F SYST BP >= 140 MM HG: CPT | Performed by: NURSE PRACTITIONER

## 2024-01-09 NOTE — PROGRESS NOTES
HPI:    Patient ID: Peter Lacy is a 58 year old male.      HPI  Right Knee Pain  58-year-old male who is a patient of Dr. Jackie Guzman.  He states he has been going through physical therapy for his right knee and it is improved.  He walks his dog with no pain.  Patient requesting a letter to go back to work without restrictions      Immunization History   Administered Date(s) Administered    Covid-19 Vaccine Pfizer 30 mcg/0.3 ml 06/23/2021, 07/14/2021       History reviewed. No pertinent past medical history.   History reviewed. No pertinent surgical history.   Social History     Socioeconomic History    Marital status:    Tobacco Use    Smoking status: Never    Smokeless tobacco: Never   Vaping Use    Vaping Use: Never used   Substance and Sexual Activity    Alcohol use: Yes     Comment: weekly    Drug use: No    Sexual activity: Yes     Partners: Female          Review of Systems   Constitutional:  Negative for chills, fatigue and fever.   HENT:  Negative for ear pain, hearing loss, sinus pain, sore throat and trouble swallowing.    Eyes:  Negative for pain and visual disturbance.   Respiratory:  Negative for cough, chest tightness and shortness of breath.    Cardiovascular:  Negative for chest pain, palpitations and leg swelling.   Gastrointestinal:  Negative for abdominal pain, constipation, diarrhea, nausea and vomiting.   Endocrine: Negative for cold intolerance and heat intolerance.   Genitourinary:  Negative for dysuria and hematuria.   Musculoskeletal:  Negative for back pain and joint swelling.        Right Knee Pain   Skin:  Negative for rash.   Allergic/Immunologic: Negative for environmental allergies.   Neurological:  Negative for weakness, numbness and headaches.   Hematological:  Does not bruise/bleed easily.   Psychiatric/Behavioral:  Negative for dysphoric mood and sleep disturbance. The patient is not nervous/anxious.               Current Outpatient Medications   Medication  Sig Dispense Refill    Meloxicam 15 MG Oral Tab Take 1 tablet (15 mg total) by mouth daily. 30 tablet 0    hydroCHLOROthiazide 25 MG Oral Tab Take 1 tablet (25 mg total) by mouth daily. 90 tablet 2    Na Sulfate-K Sulfate-Mg Sulf (SUPREP BOWEL PREP KIT) 17.5-3.13-1.6 GM/177ML Oral Solution Take as discussed in clinic (Patient not taking: Reported on 1/9/2024) 1 each 0     Allergies:No Known Allergies   PHYSICAL EXAM:   Physical Exam  Constitutional:       Appearance: Normal appearance. He is well-developed.   HENT:      Head: Normocephalic.      Right Ear: Tympanic membrane normal.      Left Ear: Tympanic membrane normal.      Nose: Nose normal.      Mouth/Throat:      Mouth: Mucous membranes are moist.      Pharynx: No oropharyngeal exudate or posterior oropharyngeal erythema.   Eyes:      General:         Right eye: No discharge.         Left eye: No discharge.      Pupils: Pupils are equal, round, and reactive to light.   Cardiovascular:      Rate and Rhythm: Normal rate and regular rhythm.      Heart sounds: Normal heart sounds. No murmur heard.     No friction rub. No gallop.   Pulmonary:      Effort: Pulmonary effort is normal. No respiratory distress.      Breath sounds: Normal breath sounds. No wheezing, rhonchi or rales.   Abdominal:      General: Bowel sounds are normal. There is no distension.      Palpations: Abdomen is soft. There is no mass.      Tenderness: There is no abdominal tenderness. There is no right CVA tenderness, left CVA tenderness or guarding.   Musculoskeletal:         General: No tenderness.      Cervical back: Normal range of motion and neck supple. No tenderness.      Right lower leg: No edema.      Left lower leg: No edema.   Lymphadenopathy:      Cervical: No cervical adenopathy.   Skin:     General: Skin is warm and dry.      Findings: No rash.   Neurological:      Mental Status: He is alert and oriented to person, place, and time.      Coordination: Coordination normal.      Gait:  Gait normal.   Psychiatric:         Mood and Affect: Mood normal.         Behavior: Behavior normal.         Thought Content: Thought content normal.         Judgment: Judgment normal.       /80 (BP Location: Left arm, Patient Position: Sitting, Cuff Size: adult)   Pulse 62   Ht 5' 4\" (1.626 m)   Wt 180 lb (81.6 kg)   SpO2 94%   BMI 30.90 kg/m²   Wt Readings from Last 2 Encounters:   01/09/24 180 lb (81.6 kg)   11/13/23 176 lb (79.8 kg)     Body mass index is 30.9 kg/m².(2)  Lab Results   Component Value Date    WBC 5.8 11/04/2023    RBC 4.95 11/04/2023    HGB 14.4 11/04/2023    HCT 43.6 11/04/2023    MCV 88.1 11/04/2023    MCH 29.1 11/04/2023    MCHC 33.0 11/04/2023    RDW 12.9 11/04/2023    .0 11/04/2023    MPV 8.7 10/13/2018      Lab Results   Component Value Date    GLU 95 11/04/2023    BUN 14 11/04/2023    BUNCREA 14.6 11/04/2023    CREATSERUM 0.96 11/04/2023    ANIONGAP 9 11/04/2023    GFRNAA 95 05/27/2021    GFRAA 109 05/27/2021    CA 9.0 11/04/2023    OSMOCALC 286 11/04/2023    ALKPHO 46 11/04/2023    AST 15 11/04/2023    ALT 16 11/04/2023    BILT 0.5 11/04/2023    TP 6.9 11/04/2023    ALB 4.3 11/04/2023    GLOBULIN 2.6 (L) 11/04/2023     11/04/2023    K 4.2 11/04/2023     11/04/2023    CO2 26.0 11/04/2023      Lab Results   Component Value Date     05/27/2021    A1C 5.9 (H) 05/27/2021      Lab Results   Component Value Date    CHOLEST 189 11/04/2023    TRIG 71 11/04/2023    HDL 48 11/04/2023     (H) 11/04/2023    VLDL 13 11/04/2023    NONHDLC 141 (H) 11/04/2023      Lab Results   Component Value Date    TSH 1.428 11/04/2023                ASSESSMENT/PLAN:     Problem List Items Addressed This Visit       Acute pain of right knee - Primary     Patient states his knee pain resolved with nonsteroidal anti-inflammatory medication and P.T.    Plan  Patient requesting a letter to go back to work without restrictions.  Recommended Voltaren gel over-the-counter if he  should have a flareup               No orders of the defined types were placed in this encounter.      Meds This Visit:  Requested Prescriptions      No prescriptions requested or ordered in this encounter       Imaging & Referrals:  None         SEBLE Walker

## 2024-01-09 NOTE — ASSESSMENT & PLAN NOTE
Patient states his knee pain resolved with nonsteroidal anti-inflammatory medication and P.T.    Plan  Patient requesting a letter to go back to work without restrictions.  Recommended Voltaren gel over-the-counter if he should have a flareup

## 2024-05-13 ENCOUNTER — ANESTHESIA (OUTPATIENT)
Dept: ENDOSCOPY | Age: 59
End: 2024-05-13
Payer: COMMERCIAL

## 2024-05-13 ENCOUNTER — ANESTHESIA EVENT (OUTPATIENT)
Dept: ENDOSCOPY | Age: 59
End: 2024-05-13
Payer: COMMERCIAL

## 2024-05-13 ENCOUNTER — HOSPITAL ENCOUNTER (OUTPATIENT)
Age: 59
Setting detail: HOSPITAL OUTPATIENT SURGERY
Discharge: HOME | End: 2024-05-13
Attending: STUDENT IN AN ORGANIZED HEALTH CARE EDUCATION/TRAINING PROGRAM | Admitting: STUDENT IN AN ORGANIZED HEALTH CARE EDUCATION/TRAINING PROGRAM

## 2024-05-13 VITALS
RESPIRATION RATE: 18 BRPM | DIASTOLIC BLOOD PRESSURE: 83 MMHG | HEIGHT: 64 IN | SYSTOLIC BLOOD PRESSURE: 166 MMHG | OXYGEN SATURATION: 99 % | WEIGHT: 172 LBS | BODY MASS INDEX: 29.37 KG/M2 | HEART RATE: 49 BPM

## 2024-05-13 DIAGNOSIS — Z12.11 COLON CANCER SCREENING: ICD-10-CM

## 2024-05-13 PROCEDURE — 45385 COLONOSCOPY W/LESION REMOVAL: CPT | Performed by: STUDENT IN AN ORGANIZED HEALTH CARE EDUCATION/TRAINING PROGRAM

## 2024-05-13 PROCEDURE — 99070 SPECIAL SUPPLIES PHYS/QHP: CPT | Performed by: STUDENT IN AN ORGANIZED HEALTH CARE EDUCATION/TRAINING PROGRAM

## 2024-05-13 DEVICE — REPLAY HEMOSTASIS CLIP, 11MM SPAN
Type: IMPLANTABLE DEVICE | Site: COLON | Status: FUNCTIONAL
Brand: REPLAY

## 2024-05-13 RX ORDER — LIDOCAINE HYDROCHLORIDE 10 MG/ML
INJECTION, SOLUTION EPIDURAL; INFILTRATION; INTRACAUDAL; PERINEURAL AS NEEDED
Status: DISCONTINUED | OUTPATIENT
Start: 2024-05-13 | End: 2024-05-13 | Stop reason: SURG

## 2024-05-13 RX ORDER — SODIUM CHLORIDE, SODIUM LACTATE, POTASSIUM CHLORIDE, CALCIUM CHLORIDE 600; 310; 30; 20 MG/100ML; MG/100ML; MG/100ML; MG/100ML
INJECTION, SOLUTION INTRAVENOUS CONTINUOUS
Status: DISCONTINUED | OUTPATIENT
Start: 2024-05-13 | End: 2024-05-13

## 2024-05-13 RX ADMIN — SODIUM CHLORIDE, SODIUM LACTATE, POTASSIUM CHLORIDE, CALCIUM CHLORIDE: 600; 310; 30; 20 INJECTION, SOLUTION INTRAVENOUS at 08:45:00

## 2024-05-13 RX ADMIN — LIDOCAINE HYDROCHLORIDE 50 MG: 10 INJECTION, SOLUTION EPIDURAL; INFILTRATION; INTRACAUDAL; PERINEURAL at 08:45:00

## 2024-05-13 NOTE — ANESTHESIA POSTPROCEDURE EVALUATION
Patient: Peter Lacy    Procedure Summary       Date: 05/13/24 Room / Location: [facility] ENDOSCOPY 02 / [facility] ENDO    Anesthesia Start: 0843 Anesthesia Stop:     Procedure: COLONOSCOPY Diagnosis:       Colon cancer screening      (polyps)    Surgeons: Yariel Stout MD Anesthesiologist:     Anesthesia Type: general, MAC ASA Status: 3            Anesthesia Type: general, MAC    Vitals Value Taken Time   /68 05/13/24 0925   Temp 98.6 05/13/24 0925   Pulse 68 05/13/24 0925   Resp 13 05/13/24 0925   SpO2 98 05/13/24 0925       [facility] AN Post Evaluation:   Patient Evaluated in PACU  Patient Participation: complete - patient participated  Level of Consciousness: awake and alert  Pain Score: 0  Pain Management: adequate  Airway Patency:patent  Dental exam unchanged from preop  Yes    Nausea/Vomiting: none  Cardiovascular Status: acceptable  Respiratory Status: acceptable  Postoperative Hydration acceptable      OMAR SPRAGUE CRNA  5/13/2024 9:25 AM

## 2024-05-13 NOTE — DISCHARGE INSTRUCTIONS
Home Care Instructions for Colonoscopy  with Sedation    Diet:  - Resume your regular diet as tolerated unless otherwise instructed.  - Start with light meals to minimize bloating.  - Do not drink alcohol today.    Medication:  - If you have questions about resuming your normal medications, please contact your Primary Care Physician.    Activities:  - Take it easy today. Do not return to work today.  - Do not drive today.  - Do not operate any machinery today (including kitchen equipment).    Colonoscopy:  - You may notice some rectal \"spotting\" (a little blood on the toilet tissue) for a day or two after the exam. This is normal.  - If you experience any rectal bleeding (not spotting), persistent tenderness or sharp severe abdominal pains, oral temperature over 100 degrees Fahrenheit, light-headedness or dizziness, or any other problems, contact your doctor.        **If unable to reach your doctor, please go to the [facility] Emergency Room**    - Your referring physician will receive a full report of your examination.  - If you do not hear from your doctor's office within two weeks of your biopsy, please call them for your results.    You may be able to see your laboratory results in [email or web address] between 4 and 7 business days.  In some cases, your physician may not have viewed the results before they are released to [email or web address].  If you have questions regarding your results contact the physician who ordered the test/exam by phone or via [email or web address] by choosing \"Ask a Medical Question.\"

## 2024-05-13 NOTE — H&P
History & Physical Examination    Patient Name: Peter Lacy  MRN: [id number]  [facility]: [id number]  YOB: 1965    Diagnosis: screening for colon cancer    Medications Prior to Admission   Medication Sig Dispense Refill Last Dose    Na Sulfate-K Sulfate-Mg Sulf (SUPREP BOWEL PREP KIT) 17.5-3.13-1.6 GM/177ML Oral Solution Take as discussed in clinic (Patient not taking: Reported on 1/9/2024) 1 each 0     Meloxicam 15 MG Oral Tab Take 1 tablet (15 mg total) by mouth daily. (Patient not taking: Reported on 5/7/2024) 30 tablet 0 Not Taking    hydroCHLOROthiazide 25 MG Oral Tab Take 1 tablet (25 mg total) by mouth daily. (Patient not taking: Reported on 5/7/2024) 90 tablet 2 Not Taking     No current facility-administered medications for this encounter.       Allergies:   Allergies   Allergen Reactions    Seasonal ITCHING       Past Medical History:    High blood pressure     History reviewed. No pertinent surgical history.  Family History   Problem Relation Age of Onset    Hypertension Father     Diabetes Father     Heart Disorder Father         heart attack - age 86     Stroke Father         in 2016     Hypertension Mother     Arthritis Mother      Social History     Tobacco Use    Smoking status: Never    Smokeless tobacco: Never   Substance Use Topics    Alcohol use: Yes     Comment: occ.       SYSTEM Check if Review is Normal Check if Physical Exam is Normal If not normal, please explain:   HEENT [X ] [ X]    NECK  [X ] [ X]    HEART [X ] [ X]    LUNGS [X ] [ X]    ABDOMEN [X ] [ X]    EXTREMITIES [X ] [ X]    OTHER        I have discussed the risks and benefits and alternatives of the procedure with the patient/family.  They understand and agree to proceed with plan of care.   I have reviewed the History and Physical done within the last 30 days.  Any changes noted above.    Yariel Stout MD  [facility]

## 2024-05-13 NOTE — ANESTHESIA PREPROCEDURE EVALUATION
Anesthesia PreOp Note    HPI:     Peter Lacy is a 59 year old male who presents for preoperative consultation requested by: Yariel Stout MD    Date of Surgery: 5/13/2024    Procedure(s):  COLONOSCOPY  Indication: Colon cancer screening    Relevant Problems   No relevant active problems       NPO:  Last Liquid Consumption Date: 05/13/24  Last Liquid Consumption Time: 0530  Last Solid Consumption Date: 05/12/24  Last Solid Consumption Time: 1000  Last Liquid Consumption Date: 05/13/24          History Review:  Patient Active Problem List    Diagnosis Date Noted    Acute pain of right knee 01/09/2024    Class 1 obesity due to excess calories without serious comorbidity with body mass index (BMI) of 31.0 to 31.9 in adult 07/11/2018    Essential hypertension 07/11/2018       Past Medical History:    High blood pressure       History reviewed. No pertinent surgical history.    Medications Prior to Admission   Medication Sig Dispense Refill Last Dose    Na Sulfate-K Sulfate-Mg Sulf (SUPREP BOWEL PREP KIT) 17.5-3.13-1.6 GM/177ML Oral Solution Take as discussed in clinic (Patient not taking: Reported on 1/9/2024) 1 each 0     Meloxicam 15 MG Oral Tab Take 1 tablet (15 mg total) by mouth daily. (Patient not taking: Reported on 5/7/2024) 30 tablet 0 Not Taking    hydroCHLOROthiazide 25 MG Oral Tab Take 1 tablet (25 mg total) by mouth daily. (Patient not taking: Reported on 5/7/2024) 90 tablet 2 Not Taking     Current Facility-Administered Medications Ordered in Epic   Medication Dose Route Frequency Provider Last Rate Last Admin    lactated ringers infusion   Intravenous Continuous Yariel Stout MD         No current [facility]-ordered outpatient medications on file.       Allergies   Allergen Reactions    Seasonal ITCHING       Family History   Problem Relation Age of Onset    Hypertension Father     Diabetes Father     Heart Disorder Father         heart attack - age 86     Stroke Father         in 2016      Hypertension Mother     Arthritis Mother      Social History     Socioeconomic History    Marital status:    Tobacco Use    Smoking status: Never    Smokeless tobacco: Never   Vaping Use    Vaping status: Never Used   Substance and Sexual Activity    Alcohol use: Yes     Comment: occ.    Drug use: No    Sexual activity: Yes     Partners: Female       Available pre-op labs reviewed.             Vital Signs:  Body mass index is 29.52 kg/m².   height is 1.626 m (5' 4\") and weight is 78 kg (172 lb). His blood pressure is 143/89 and his pulse is 64. His respiration is 15 and oxygen saturation is 97%.   Vitals:    05/07/24 1248 05/13/24 0818   BP:  143/89   Pulse:  64   Resp:  15   SpO2:  97%   Weight: 78 kg (172 lb)    Height: 1.626 m (5' 4\")         Anesthesia Evaluation     Patient summary reviewed and Nursing notes reviewed    Airway   Mallampati: II  Dental - Dentition appears grossly intact     Pulmonary - negative ROS and normal exam   Cardiovascular - normal exam  (+) hypertension    Neuro/Psych - negative ROS     GI/Hepatic/Renal    (+) bowel prep    Endo/Other - negative ROS   Abdominal                  Anesthesia Plan:   ASA:  3  Plan:   General and MAC  Informed Consent Plan and Risks Discussed With:  Patient  Discussed plan with:  CRNA and surgeon      I have informed Peter Regino and/or legal guardian or family member of the nature of the anesthetic plan, benefits, risks including possible dental damage if relevant, major complications, and any alternative forms of anesthetic management.   All of the patient's questions were answered to the best of my ability. The patient desires the anesthetic management as planned.  OMAR SPRAGUE CRNA  5/13/2024 8:36 AM  Present on Admission:  **None**

## 2024-05-13 NOTE — OPERATIVE REPORT
COLONOSCOPY REPORT    Peter Lacy     1965 Age 59 year old   PCP Jackie Guzman MD Endoscopist Yariel Stout MD       Date of procedure: 24    Procedure: Colonoscopy w/ cold snare polypectomy, cold forceps polypectomy    Pre-operative diagnosis: screening    Post-operative diagnosis: polyps, diverticulosis    Medications: MAC    Withdrawal time: 30 minutes    Procedure:  Informed consent was obtained from the patient after the risks of the procedure were discussed, including but not limited to bleeding, perforation, aspiration, infection, or possibility of a missed lesion. After discussions of the risks/benefits and alternatives to this procedure, as well as the planned sedation, the patient was placed in the left lateral decubitus position and begun on continuous blood pressure pulse oximetry and EKG monitoring and this was maintained throughout the procedure. Once an adequate level of sedation was obtained a digital rectal exam was completed. Then the lubricated tip of the pediatric [id number] diagnostic video colonoscope was inserted and advanced without difficulty to the cecum using water immersion and CO2 insufflation technique. The cecum was identified by localizing the trifold, the appendix and the ileocecal valve. Withdrawal was begun with thorough washing and careful examination of the colonic walls and folds. A routine second examination of the cecum/ascending colon was performed. Photodocumentation was obtained. The bowel prep was good. Views of the colon were good with washing. I then carefully withdrew the instrument from the patient who tolerated the procedure well.     Complications: none.    Findings:   1. 3 polyps noted as follows:      A. 8 mm polyp in the cecum; sessile morphology; cold snare polypectomy performed, polyp retrieved. Oozing after resection for which 1 hemostatic clip was placed with cessation in bleeding.      B. 2 mm polyp in the transverse colon;  sessile morphology; cold forceps polypectomy performed, polyp retrieved.      C. 12 mm polyp in the transverse colon; semipedunculated morphology; cold snare polypectomy performed, polyp retrieved. Oozing after resection which stopped after placement of 4 hemostatic clips.    2. Diverticulosis: small diverticula visualized in the left colon.    3. Ileocecal valve appeared healthy and normal. Terminal ileum was intubated and appeared normal.    4. The colonic mucosa throughout the colon showed normal vascular pattern, without evidence of angioectasias or inflammation.     5. A retroflexed view of the rectum was unremarkable.    6. MAURICE: normal rectal tone, no masses palpated.     Impression:   3 polyps resected ranging in size from 2 mm to 12 mm. Removed via cold snare/cold biopsy forceps. In total 5 hemostatic clips were placed, 4 of which were placed on the defect of the transverse colon site.  Diverticulosis.  The colon was otherwise normal with glistening mucosa and intact vascular pattern.    Recommend:  Await pathology. The interval for the next colonoscopy will be determined after reviewing pathology. If new signs or symptoms develop, colonoscopy may need to be repeated sooner.   High fiber diet.  Monitor for blood in the stool. If having more than just tinge of blood, call office or go to the ER.  Repeat in 3 years.  No NSAIDS for 14 days.    >>>If tissue was obtained and you have not received your pathology results either by phone or letter within 2 weeks, please call our office at [phone].    Specimens: polyps    Blood loss: <1 ml

## 2024-05-16 NOTE — PROGRESS NOTES
3 adenomas removed, needs repeat in 3 years.    Mychart sent.    GI Staff:    Can you please place recall for this patient to have a colonoscopy in 3 years.    Thank you.    Yariel Stout MD

## 2024-05-17 ENCOUNTER — TELEPHONE (OUTPATIENT)
Facility: CLINIC | Age: 59
End: 2024-05-17

## 2024-05-17 NOTE — TELEPHONE ENCOUNTER
----- Message from Yariel Stout sent at 5/16/2024  1:47 PM CDT -----  3 adenomas removed, needs repeat in 3 years.    Mychart sent.    GI Staff:    Can you please place recall for this patient to have a colonoscopy in 3 years.    Thank you.    Yariel Stout MD

## 2024-05-17 NOTE — TELEPHONE ENCOUNTER
Recall colon in 3 years per Dr Stout. Colon done 05/13/2024    Health maintenance updated and message sent to pt outreach to repeat colonoscopy in 3 years

## 2025-08-09 ENCOUNTER — OFFICE VISIT (OUTPATIENT)
Dept: INTERNAL MEDICINE CLINIC | Facility: CLINIC | Age: 60
End: 2025-08-09

## 2025-08-09 ENCOUNTER — LAB ENCOUNTER (OUTPATIENT)
Dept: LAB | Facility: HOSPITAL | Age: 60
End: 2025-08-09
Attending: INTERNAL MEDICINE

## 2025-08-09 VITALS
BODY MASS INDEX: 30.56 KG/M2 | HEART RATE: 70 BPM | WEIGHT: 179 LBS | TEMPERATURE: 97 F | SYSTOLIC BLOOD PRESSURE: 148 MMHG | HEIGHT: 64 IN | DIASTOLIC BLOOD PRESSURE: 86 MMHG

## 2025-08-09 DIAGNOSIS — Z12.5 ENCOUNTER FOR SCREENING FOR MALIGNANT NEOPLASM OF PROSTATE: ICD-10-CM

## 2025-08-09 DIAGNOSIS — R73.03 PREDIABETES: ICD-10-CM

## 2025-08-09 DIAGNOSIS — Z00.00 PHYSICAL EXAM, ANNUAL: Primary | ICD-10-CM

## 2025-08-09 DIAGNOSIS — Z00.00 PHYSICAL EXAM, ANNUAL: ICD-10-CM

## 2025-08-09 LAB
ALBUMIN SERPL-MCNC: 4.7 G/DL (ref 3.2–4.8)
ALBUMIN/GLOB SERPL: 1.8 (ref 1–2)
ALP LIVER SERPL-CCNC: 51 U/L (ref 45–117)
ALT SERPL-CCNC: 17 U/L (ref 10–49)
ANION GAP SERPL CALC-SCNC: 8 MMOL/L (ref 0–18)
AST SERPL-CCNC: 17 U/L (ref ?–34)
BILIRUB SERPL-MCNC: 0.6 MG/DL (ref 0.2–1.1)
BUN BLD-MCNC: 12 MG/DL (ref 9–23)
BUN/CREAT SERPL: 11.1 (ref 10–20)
CALCIUM BLD-MCNC: 9 MG/DL (ref 8.7–10.4)
CHLORIDE SERPL-SCNC: 102 MMOL/L (ref 98–112)
CHOLEST SERPL-MCNC: 182 MG/DL (ref ?–200)
CO2 SERPL-SCNC: 28 MMOL/L (ref 21–32)
COMPLEXED PSA SERPL-MCNC: 2.68 NG/ML (ref ?–4)
CREAT BLD-MCNC: 1.08 MG/DL (ref 0.7–1.3)
DEPRECATED RDW RBC AUTO: 39.8 FL (ref 35.1–46.3)
EGFRCR SERPLBLD CKD-EPI 2021: 78 ML/MIN/1.73M2 (ref 60–?)
ERYTHROCYTE [DISTWIDTH] IN BLOOD BY AUTOMATED COUNT: 12.7 % (ref 11–15)
EST. AVERAGE GLUCOSE BLD GHB EST-MCNC: 134 MG/DL (ref 68–126)
FASTING PATIENT LIPID ANSWER: YES
FASTING STATUS PATIENT QL REPORTED: YES
GLOBULIN PLAS-MCNC: 2.6 G/DL (ref 2–3.5)
GLUCOSE BLD-MCNC: 95 MG/DL (ref 70–99)
HBA1C MFR BLD: 6.3 % (ref ?–5.7)
HCT VFR BLD AUTO: 42.2 % (ref 39–53)
HDLC SERPL-MCNC: 48 MG/DL (ref 40–59)
HGB BLD-MCNC: 14.5 G/DL (ref 13–17.5)
LDLC SERPL CALC-MCNC: 115 MG/DL (ref ?–100)
MCH RBC QN AUTO: 29.4 PG (ref 26–34)
MCHC RBC AUTO-ENTMCNC: 34.4 G/DL (ref 31–37)
MCV RBC AUTO: 85.4 FL (ref 80–100)
NONHDLC SERPL-MCNC: 134 MG/DL (ref ?–130)
OSMOLALITY SERPL CALC.SUM OF ELEC: 286 MOSM/KG (ref 275–295)
PLATELET # BLD AUTO: 259 10(3)UL (ref 150–450)
POTASSIUM SERPL-SCNC: 3.9 MMOL/L (ref 3.5–5.1)
PROT SERPL-MCNC: 7.3 G/DL (ref 5.7–8.2)
RBC # BLD AUTO: 4.94 X10(6)UL (ref 4.3–5.7)
SODIUM SERPL-SCNC: 138 MMOL/L (ref 136–145)
TRIGL SERPL-MCNC: 105 MG/DL (ref 30–149)
TSI SER-ACNC: 1.33 UIU/ML (ref 0.55–4.78)
VLDLC SERPL CALC-MCNC: 18 MG/DL (ref 0–30)
WBC # BLD AUTO: 7.8 X10(3) UL (ref 4–11)

## 2025-08-09 PROCEDURE — 99396 PREV VISIT EST AGE 40-64: CPT | Performed by: INTERNAL MEDICINE

## 2025-08-09 PROCEDURE — 83036 HEMOGLOBIN GLYCOSYLATED A1C: CPT

## 2025-08-09 PROCEDURE — 80061 LIPID PANEL: CPT

## 2025-08-09 PROCEDURE — 90715 TDAP VACCINE 7 YRS/> IM: CPT | Performed by: INTERNAL MEDICINE

## 2025-08-09 PROCEDURE — 90471 IMMUNIZATION ADMIN: CPT | Performed by: INTERNAL MEDICINE

## 2025-08-09 PROCEDURE — 80053 COMPREHEN METABOLIC PANEL: CPT

## 2025-08-09 PROCEDURE — 36415 COLL VENOUS BLD VENIPUNCTURE: CPT

## 2025-08-09 PROCEDURE — 85027 COMPLETE CBC AUTOMATED: CPT

## 2025-08-09 PROCEDURE — 84443 ASSAY THYROID STIM HORMONE: CPT

## (undated) DEVICE — KIT CLEAN ENDOKIT 1.1OZ GOWNX2

## (undated) DEVICE — MEDI-VAC NON-CONDUCTIVE SUCTION TUBING 6MM X 1.8M (6FT.) L: Brand: CARDINAL HEALTH

## (undated) DEVICE — GIJAW SINGLE-USE BIOPSY FORCEPS WITH NEEDLE: Brand: GIJAW

## (undated) DEVICE — Device: Brand: DUAL NARE NASAL CANNULAE FEMALE LUER CON 7FT O2 TUBE

## (undated) DEVICE — KIT ENDO ORCAPOD 160/180/190

## (undated) DEVICE — SNARE ENDOSCP 9MM SHTH DIA2.4MM L230CM EXACTO

## (undated) DEVICE — TRAP POLYP W/ 2 SPEC TY CLR MAGNIFYING WIND

## (undated) DEVICE — 60 ML SYRINGE REGULAR TIP: Brand: MONOJECT

## (undated) DEVICE — SINGLE-USE SNARE: Brand: CAPTIVATOR™ COLD

## (undated) NOTE — LETTER
1/9/2024          To Whom It May Concern:    Peter Lacy is currently under my medical care and may not return to work at this time.    He may return to work on 01/15/2024  Activity is restricted as follows: none.    If you require additional information please contact our office.        Sincerely,     SEBLE Walker

## (undated) NOTE — LETTER
08/10/18        Peter Curtis [address]      Dear Urbano Woodruff,    [facility] records indicate that you have outstanding lab work and or testing that was ordered for you and has not yet been completed:          Comp Metabolic Panel (14) [E

## (undated) NOTE — LETTER
[facility]  [address], [place], [place]    Authorization for Surgical Operation and Procedure                               I hereby authorize Yariel Stout MD, my physician and his/her assistants (if applicable), which may include medical students, residents, and/or fellows, to perform the following surgical operation/ procedure and administer such anesthesia as may be determined necessary by my physician: Operation/Procedure name (s) COLONOSCOPY on Peter Regino   2.   I recognize that during the surgical operation/procedure, unforeseen conditions may necessitate additional or different procedures than those listed above.  I, therefore, further authorize and request that the above-named surgeon, assistants, or designees perform such procedures as are, in their judgment, necessary and desirable.    3.   My surgeon/physician has discussed prior to my surgery the potential benefits, risks and side effects of this procedure; the likelihood of achieving goals; and potential problems that might occur during recuperation.  They also discussed reasonable alternatives to the procedure, including risks, benefits, and side effects related to the alternatives and risks related to not receiving this procedure.  I have had all my questions answered and I acknowledge that no guarantee has been made as to the result that may be obtained.    4.   Should the need arise during my operation/procedure, which includes change of level of care prior to discharge, I also consent to the administration of blood and/or blood products.  Further, I understand that despite careful testing and screening of blood or blood products by collecting agencies, I may still be subject to ill effects as a result of receiving a blood transfusion and/or blood products.  The following are some, but not all, of the potential risks that can occur: fever and allergic reactions, hemolytic reactions, transmission of diseases such  as Hepatitis, AIDS and Cytomegalovirus (CMV) and fluid overload.  In the event that I wish to have an autologous transfusion of my own blood, or a directed donor transfusion, I will discuss this with my physician.  Check only if Refusing Blood or Blood Products  I understand refusal of blood or blood products as deemed necessary by my physician may have serious consequences to my condition to include possible death. I hereby assume responsibility for my refusal and release the hospital, its personnel, and my physicians from any responsibility for the consequences of my refusal.    o  Refuse   5.   I authorize the use of any specimen, organs, tissues, body parts or foreign objects that may be removed from my body during the operation/procedure for diagnosis, research or teaching purposes and their subsequent disposal by hospital authorities.  I also authorize the release of specimen test results and/or written reports to my treating physician on the hospital medical staff or other referring or consulting physicians involved in my care, at the discretion of the Pathologist or my treating physician.    6.   I consent to the photographing or videotaping of the operations or procedures to be performed, including appropriate portions of my body for medical, scientific, or educational purposes, provided my identity is not revealed by the pictures or by descriptive texts accompanying them.  If the procedure has been photographed/videotaped, the surgeon will obtain the original picture, image, videotape or CD.  The hospital will not be responsible for storage, release or maintenance of the picture, image, tape or CD.    7.   I consent to the presence of a  or observers in the operating room as deemed necessary by my physician or their designees.    8.   I recognize that in the event my procedure results in extended X-Ray/fluoroscopy time, I may develop a skin reaction.    9. If I have a Do Not Attempt  Resuscitation (DNAR) order in place, that status will be suspended while in the operating room, procedural suite, and during the recovery period unless otherwise explicitly stated by me (or a person authorized to consent on my behalf). The surgeon or my attending physician will determine when the applicable recovery period ends for purposes of reinstating the DNAR order.  10. Patients having a sterilization procedure: I understand that if the procedure is successful the results will be permanent and it will therefore be impossible for me to inseminate, conceive, or bear children.  I also understand that the procedure is intended to result in sterility, although the result has not been guaranteed.   11. I acknowledge that my physician has explained sedation/analgesia administration to me including the risk and benefits I consent to the administration of sedation/analgesia as may be necessary or desirable in the judgment of my physician.    I CERTIFY THAT I HAVE READ AND FULLY UNDERSTAND THE ABOVE CONSENT TO OPERATION and/or OTHER PROCEDURE.     ____________________________________  _________________________________        ______________________________  Signature of Patient    Signature of Responsible Person                Printed Name of Responsible Person                                      ____________________________________  _____________________________                ________________________________  Signature of Witness        Date  Time         Relationship to Patient    STATEMENT OF PHYSICIAN My signature below affirms that prior to the time of the procedure; I have explained to the patient and/or his/her legal representative, the risks and benefits involved in the proposed treatment and any reasonable alternative to the proposed treatment. I have also explained the risks and benefits involved in refusal of the proposed treatment and alternatives to the proposed treatment and have answered the patient's  questions. If I have a significant financial interest in a co-management agreement or a significant financial interest in any product or implant, or other significant relationship used in this procedure/surgery, I have disclosed this and had a discussion with my patient.     _____________________________________________________              _____________________________  (Signature of Physician)                                                                                         (Date)                                   (Time)  Patient Name: Peter Lacy      : 1965      Printed: 5/10/2024     Medical Record #: [id number]                                      Page 1 of 1

## (undated) NOTE — LETTER
1/9/2024              Peter Lacy        [address] [facility] [address]         Dear Peter,      Sincerely,    SEBLE Walker  [address] [address]  [phone]        Document electronically generated by:  SEBLE Walker

## (undated) NOTE — LETTER
[facility]  Administration of Anesthesia  Peter ARGUELLES agree to be cared for by a physician anesthesiologist alone and/or with a nurse anesthetist, who is specially trained to monitor me and give me medicine to put me to sleep or keep me comfortable during my procedure    I understand that my anesthesiologist and/or anesthetist is not an employee or agent of [facility] or [employer]. He or she works for [facility] [facility]    As the patient asking for anesthesia services, I agree to:  Allow the anesthesiologist (anesthesia doctor) to give me medicine and do additional procedures as necessary. Some examples are: Starting or using an “IV” to give me medicine, fluids or blood during my procedure, and having a breathing tube placed to help me breathe when I’m asleep (intubation). In the event that my heart stops working properly, I understand that my anesthesiologist will make every effort to sustain my life, unless otherwise directed by [facility] Do Not Resuscitate documents.  Tell my anesthesia doctor before my procedure:  If I am pregnant.  The last time that I ate or drank.  iii. All of the medicines I take (including prescriptions, herbal supplements, and pills I can buy without a prescription (including street drugs/illegal medications). Failure to inform my anesthesiologist about these medicines may increase my risk of anesthetic complications.  iv.If I am allergic to anything or have had a reaction to anesthesia before.  I understand how the anesthesia medicine will help me (benefits).  I understand that with any type of anesthesia medicine there are risks:  The most common risks are: nausea, vomiting, sore throat, muscle soreness, damage to my eyes, mouth, or teeth (from breathing tube placement).  Rare risks include: remembering what happened during my procedure, allergic reactions to medications, injury to my airway, heart, lungs, vision, nerves, or  muscles and in extremely rare instances death.  My doctor has explained to me other choices available to me for my care (alternatives).  Pregnant Patients (“epidural”):  I understand that the risks of having an epidural (medicine given into my back to help control pain during labor), include itching, low blood pressure, difficulty urinating, headache or slowing of the baby’s heart. Very rare risks include infection, bleeding, seizure, irregular heart rhythms and nerve injury.  Regional Anesthesia (“spinal”, “epidural”, & “nerve blocks”):  I understand that rare but potential complications include headache, bleeding, infection, seizure, irregular heart rhythms, and nerve injury.    _____________________________________________________________________________  Patient (or Representative) Signature/Relationship to Patient  Date   Time    _____________________________________________________________________________   Name (if used)    Language/Organization   Time    _____________________________________________________________________________  Nurse Anesthetist Signature     Date   Time  _____________________________________________________________________________  Anesthesiologist Signature     Date   Time  I have discussed the procedure and information above with the patient (or patient’s representative) and answered their questions. The patient or their representative has agreed to have anesthesia services.    _____________________________________________________________________________  Witness        Date   Time  I have verified that the signature is that of the patient or patient’s representative, and that it was signed before the procedure  Patient Name: Peter Lacy     : 1965                 Printed: 5/10/2024 at 7:22 AM    Medical Record #: [id number]                                            Page 1 of 1  ----------ANESTHESIA CONSENT----------

## (undated) NOTE — LETTER
10/30/2023          To Whom It May Concern:    Gautam Mix is currently under my medical care and may not return to work at this time. Please excuse Peter for 2 weeks. He may return to work after further evaluation and treatments he has been given. If you require additional information please contact our office.         Sincerely,    Eh Epps MD          Document generated by:  Eh Epps MD

## (undated) NOTE — LETTER
11/9/2023          To Whom It May Concern:    Ajith Finch is currently under my medical care and may not return to work at this time. Please excuse Peter UNTIL 1/15/2024    If you require additional information please contact our office.         Sincerely,    Nadya Kaufman MD          Document generated by:  Nadya Kaufman MD

## (undated) NOTE — LETTER
October 26, 2018     [address] [facility]      Dear Gilda Krishnan:    Below are the results from your recent visit:  Your labs look okay.  You are not anemic. Your kidneys liver thyroid are within normal limits.  His sodium is on Lymphocyte Absolute 2.0 1.0 - 4.0 K/UL    Monocyte Absolute 0.4 0.0 - 1.0 K/UL    Eosinophil Absolute 0.2 0.0 - 0.7 K/UL    Basophil Absolute 0.0 0.0 - 0.2 K/UL       If you have any questions or concerns, please don't hesitate to call.      Perry Sawyer

## (undated) NOTE — LETTER
Patient Name: Gabe Ledezma  : 1965  MRN: [id number]  Patient Address: [address] [place] [address]      Coronavirus Disease 2019 (COVID-19)     [place] [address] is committed to the safety and well-being of our patients, members, Denise Barrera If your symptoms get worse, call your healthcare provider immediately. 3. Get rest and stay hydrated.    4. If you have a medical appointment, call the healthcare provider ahead of time and tell them that you have or may have COVID-19.  5. For medical kiki fever-reducing medications; and  · Improvement in respiratory symptoms (e.g., cough, shortness of breath); and  · At least 10 days have passed since symptoms first appeared OR if asymptomatic patient or date of symptom onset is unclear then use 10 days pos donors must:    · Have had a confirmed diagnosis of COVID-19  · Be symptom-free for at least 14 days*    *Some people will be required to have a repeat COVID-19 test in order to be eligible to donate.  If you’re instructed by Virgilio that a repeat test is r random. Researchers are trying to identify similarities between people with a Post-COVID condition to better understand if there are risk factors. How do I prevent a Post-COVID condition?   The best way to prevent the long-term symptoms of COVID-19 is